# Patient Record
Sex: FEMALE | Race: ASIAN | NOT HISPANIC OR LATINO
[De-identification: names, ages, dates, MRNs, and addresses within clinical notes are randomized per-mention and may not be internally consistent; named-entity substitution may affect disease eponyms.]

---

## 2023-09-08 PROBLEM — Z00.00 ENCOUNTER FOR PREVENTIVE HEALTH EXAMINATION: Status: ACTIVE | Noted: 2023-09-08

## 2023-09-12 ENCOUNTER — APPOINTMENT (OUTPATIENT)
Dept: GYNECOLOGIC ONCOLOGY | Facility: CLINIC | Age: 38
End: 2023-09-12
Payer: MEDICAID

## 2023-09-12 VITALS
HEIGHT: 60.63 IN | OXYGEN SATURATION: 99 % | WEIGHT: 114 LBS | SYSTOLIC BLOOD PRESSURE: 100 MMHG | BODY MASS INDEX: 21.8 KG/M2 | TEMPERATURE: 97.8 F | HEART RATE: 89 BPM | RESPIRATION RATE: 17 BRPM | DIASTOLIC BLOOD PRESSURE: 67 MMHG

## 2023-09-12 DIAGNOSIS — Z83.3 FAMILY HISTORY OF DIABETES MELLITUS: ICD-10-CM

## 2023-09-12 DIAGNOSIS — F17.210 NICOTINE DEPENDENCE, CIGARETTES, UNCOMPLICATED: ICD-10-CM

## 2023-09-12 PROCEDURE — 99204 OFFICE O/P NEW MOD 45 MIN: CPT

## 2023-09-13 LAB
ALBUMIN SERPL ELPH-MCNC: 4.7 G/DL
ALP BLD-CCNC: 44 U/L
ALT SERPL-CCNC: 17 U/L
ANION GAP SERPL CALC-SCNC: 12 MMOL/L
AST SERPL-CCNC: 15 U/L
BILIRUB SERPL-MCNC: 0.4 MG/DL
BUN SERPL-MCNC: 11 MG/DL
CALCIUM SERPL-MCNC: 9.6 MG/DL
CHLORIDE SERPL-SCNC: 102 MMOL/L
CO2 SERPL-SCNC: 27 MMOL/L
CREAT SERPL-MCNC: 0.57 MG/DL
EGFR: 118 ML/MIN/1.73M2
GLUCOSE SERPL-MCNC: 102 MG/DL
HCG SERPL QL: NEGATIVE
HCT VFR BLD CALC: 36.8 %
HGB BLD-MCNC: 11.9 G/DL
MCHC RBC-ENTMCNC: 32 PG
MCHC RBC-ENTMCNC: 32.3 GM/DL
MCV RBC AUTO: 98.9 FL
PAPP-A SERPL-ACNC: <1 MIU/ML
PLATELET # BLD AUTO: 300 K/UL
POTASSIUM SERPL-SCNC: 4.6 MMOL/L
PROT SERPL-MCNC: 7.1 G/DL
RBC # BLD: 3.72 M/UL
RBC # FLD: 12.5 %
SODIUM SERPL-SCNC: 141 MMOL/L
WBC # FLD AUTO: 6.32 K/UL

## 2023-09-20 ENCOUNTER — OUTPATIENT (OUTPATIENT)
Dept: OUTPATIENT SERVICES | Facility: HOSPITAL | Age: 38
LOS: 1 days | End: 2023-09-20

## 2023-09-20 ENCOUNTER — OUTPATIENT (OUTPATIENT)
Dept: OUTPATIENT SERVICES | Facility: HOSPITAL | Age: 38
LOS: 1 days | End: 2023-09-20
Payer: COMMERCIAL

## 2023-09-20 VITALS
RESPIRATION RATE: 16 BRPM | OXYGEN SATURATION: 97 % | HEART RATE: 73 BPM | WEIGHT: 115.08 LBS | TEMPERATURE: 98 F | DIASTOLIC BLOOD PRESSURE: 67 MMHG | SYSTOLIC BLOOD PRESSURE: 98 MMHG | HEIGHT: 60 IN

## 2023-09-20 DIAGNOSIS — D06.9 CARCINOMA IN SITU OF CERVIX, UNSPECIFIED: ICD-10-CM

## 2023-09-20 DIAGNOSIS — Z98.891 HISTORY OF UTERINE SCAR FROM PREVIOUS SURGERY: Chronic | ICD-10-CM

## 2023-09-20 DIAGNOSIS — Z01.818 ENCOUNTER FOR OTHER PREPROCEDURAL EXAMINATION: ICD-10-CM

## 2023-09-20 LAB
ALBUMIN SERPL ELPH-MCNC: 4 G/DL — SIGNIFICANT CHANGE UP (ref 3.5–5)
ALP SERPL-CCNC: 38 U/L — LOW (ref 40–120)
ALT FLD-CCNC: 25 U/L DA — SIGNIFICANT CHANGE UP (ref 10–60)
ANION GAP SERPL CALC-SCNC: 9 MMOL/L — SIGNIFICANT CHANGE UP (ref 5–17)
APTT BLD: 28.3 SEC — SIGNIFICANT CHANGE UP (ref 24.5–35.6)
AST SERPL-CCNC: 7 U/L — LOW (ref 10–40)
BILIRUB SERPL-MCNC: 0.4 MG/DL — SIGNIFICANT CHANGE UP (ref 0.2–1.2)
BLD GP AB SCN SERPL QL: SIGNIFICANT CHANGE UP
BUN SERPL-MCNC: 13 MG/DL — SIGNIFICANT CHANGE UP (ref 7–18)
CALCIUM SERPL-MCNC: 8.6 MG/DL — SIGNIFICANT CHANGE UP (ref 8.4–10.5)
CHLORIDE SERPL-SCNC: 105 MMOL/L — SIGNIFICANT CHANGE UP (ref 96–108)
CO2 SERPL-SCNC: 25 MMOL/L — SIGNIFICANT CHANGE UP (ref 22–31)
CREAT SERPL-MCNC: 0.56 MG/DL — SIGNIFICANT CHANGE UP (ref 0.5–1.3)
EGFR: 119 ML/MIN/1.73M2 — SIGNIFICANT CHANGE UP
GLUCOSE SERPL-MCNC: 107 MG/DL — HIGH (ref 70–99)
HCT VFR BLD CALC: 35.1 % — SIGNIFICANT CHANGE UP (ref 34.5–45)
HGB BLD-MCNC: 11.7 G/DL — SIGNIFICANT CHANGE UP (ref 11.5–15.5)
INR BLD: 0.91 RATIO — SIGNIFICANT CHANGE UP (ref 0.85–1.18)
MCHC RBC-ENTMCNC: 31.5 PG — SIGNIFICANT CHANGE UP (ref 27–34)
MCHC RBC-ENTMCNC: 33.3 GM/DL — SIGNIFICANT CHANGE UP (ref 32–36)
MCV RBC AUTO: 94.4 FL — SIGNIFICANT CHANGE UP (ref 80–100)
NRBC # BLD: 0 /100 WBCS — SIGNIFICANT CHANGE UP (ref 0–0)
PLATELET # BLD AUTO: 243 K/UL — SIGNIFICANT CHANGE UP (ref 150–400)
POTASSIUM SERPL-MCNC: 3.9 MMOL/L — SIGNIFICANT CHANGE UP (ref 3.5–5.3)
POTASSIUM SERPL-SCNC: 3.9 MMOL/L — SIGNIFICANT CHANGE UP (ref 3.5–5.3)
PROT SERPL-MCNC: 7.5 G/DL — SIGNIFICANT CHANGE UP (ref 6–8.3)
PROTHROM AB SERPL-ACNC: 10.4 SEC — SIGNIFICANT CHANGE UP (ref 9.5–13)
RBC # BLD: 3.72 M/UL — LOW (ref 3.8–5.2)
RBC # FLD: 12.1 % — SIGNIFICANT CHANGE UP (ref 10.3–14.5)
SODIUM SERPL-SCNC: 139 MMOL/L — SIGNIFICANT CHANGE UP (ref 135–145)
WBC # BLD: 6.31 K/UL — SIGNIFICANT CHANGE UP (ref 3.8–10.5)
WBC # FLD AUTO: 6.31 K/UL — SIGNIFICANT CHANGE UP (ref 3.8–10.5)

## 2023-09-20 RX ORDER — SODIUM CHLORIDE 9 MG/ML
3 INJECTION INTRAMUSCULAR; INTRAVENOUS; SUBCUTANEOUS EVERY 8 HOURS
Refills: 0 | Status: DISCONTINUED | OUTPATIENT
Start: 2023-10-02 | End: 2023-10-16

## 2023-09-20 NOTE — H&P PST ADULT - NSANTHOSAYNRD_GEN_A_CORE
No. JAHAIRA screening performed.  STOP BANG Legend: 0-2 = LOW Risk; 3-4 = INTERMEDIATE Risk; 5-8 = HIGH Risk

## 2023-09-20 NOTE — H&P PST ADULT - HISTORY OF PRESENT ILLNESS
38year old female with no reported pmhx presents with c/o abnormal cervical cells found on pap smear. Patient is here today for presurgical testing for scheduled cone biopsy on 10/2/2023

## 2023-09-20 NOTE — H&P PST ADULT - PROBLEM SELECTOR PLAN 1
Patient is scheduled for cone biopsy on 10/2/2023  Written and oral preoperative instructions given to patient with understanding verbalized.   Instructions given to include using 4% chlorhexidine wash as directed starting 3days before day of surgery (inclusive of day of surgery)  Maintaining NPO status post midnight day before surgery  Stopping aspirin, NSAIDs, herbs, vitamins 7days before surgery   Patient is to expect a phone call day before surgery between the hours of 430- 630pm giving arrival time for surgery day.    Patient today with STOP bang Score of 0, Low risk for JAHIARA   Preoperative labs drawn here today, results pending

## 2023-09-20 NOTE — H&P PST ADULT - ATTENDING COMMENTS
Met patient and her friend at the hospital    She just finished her menstruation  We went over the pathology consult result, same as outside diagnosis  We went over again the nature of the procedure  we discussed risks and benefits  we discussed complications  we discussed post operative care  answered all questions  consent form signed  friend will wait for her    procedure cone biopsy  benefits removal of diseased tissue  complications discussed  alternative none equivalent

## 2023-09-21 LAB — SURGICAL PATHOLOGY STUDY: SIGNIFICANT CHANGE UP

## 2023-09-21 PROCEDURE — 86850 RBC ANTIBODY SCREEN: CPT

## 2023-09-21 PROCEDURE — 86900 BLOOD TYPING SEROLOGIC ABO: CPT

## 2023-09-21 PROCEDURE — 80053 COMPREHEN METABOLIC PANEL: CPT

## 2023-09-21 PROCEDURE — 86901 BLOOD TYPING SEROLOGIC RH(D): CPT

## 2023-09-21 PROCEDURE — 85730 THROMBOPLASTIN TIME PARTIAL: CPT

## 2023-09-21 PROCEDURE — 85610 PROTHROMBIN TIME: CPT

## 2023-09-21 PROCEDURE — 85027 COMPLETE CBC AUTOMATED: CPT

## 2023-09-21 PROCEDURE — 36415 COLL VENOUS BLD VENIPUNCTURE: CPT

## 2023-09-21 PROCEDURE — G0463: CPT

## 2023-10-01 ENCOUNTER — TRANSCRIPTION ENCOUNTER (OUTPATIENT)
Age: 38
End: 2023-10-01

## 2023-10-02 ENCOUNTER — TRANSCRIPTION ENCOUNTER (OUTPATIENT)
Age: 38
End: 2023-10-02

## 2023-10-02 ENCOUNTER — APPOINTMENT (OUTPATIENT)
Dept: GYNECOLOGIC ONCOLOGY | Facility: CLINIC | Age: 38
End: 2023-10-02
Payer: MEDICAID

## 2023-10-02 ENCOUNTER — OUTPATIENT (OUTPATIENT)
Dept: OUTPATIENT SERVICES | Facility: HOSPITAL | Age: 38
LOS: 1 days | End: 2023-10-02
Payer: COMMERCIAL

## 2023-10-02 VITALS
RESPIRATION RATE: 18 BRPM | TEMPERATURE: 98 F | DIASTOLIC BLOOD PRESSURE: 56 MMHG | SYSTOLIC BLOOD PRESSURE: 102 MMHG | OXYGEN SATURATION: 100 % | HEART RATE: 70 BPM

## 2023-10-02 VITALS
WEIGHT: 115.08 LBS | HEART RATE: 67 BPM | SYSTOLIC BLOOD PRESSURE: 102 MMHG | RESPIRATION RATE: 16 BRPM | TEMPERATURE: 97 F | DIASTOLIC BLOOD PRESSURE: 67 MMHG | OXYGEN SATURATION: 98 % | HEIGHT: 60 IN

## 2023-10-02 DIAGNOSIS — D06.9 CARCINOMA IN SITU OF CERVIX, UNSPECIFIED: ICD-10-CM

## 2023-10-02 DIAGNOSIS — Z98.891 HISTORY OF UTERINE SCAR FROM PREVIOUS SURGERY: Chronic | ICD-10-CM

## 2023-10-02 LAB
BLD GP AB SCN SERPL QL: SIGNIFICANT CHANGE UP
HCG UR QL: NEGATIVE — SIGNIFICANT CHANGE UP

## 2023-10-02 PROCEDURE — 88307 TISSUE EXAM BY PATHOLOGIST: CPT

## 2023-10-02 PROCEDURE — 86900 BLOOD TYPING SEROLOGIC ABO: CPT

## 2023-10-02 PROCEDURE — 36415 COLL VENOUS BLD VENIPUNCTURE: CPT

## 2023-10-02 PROCEDURE — ZZZZZ: CPT

## 2023-10-02 PROCEDURE — 88307 TISSUE EXAM BY PATHOLOGIST: CPT | Mod: 26

## 2023-10-02 PROCEDURE — 81025 URINE PREGNANCY TEST: CPT

## 2023-10-02 PROCEDURE — 57520 CONIZATION OF CERVIX: CPT

## 2023-10-02 PROCEDURE — 86901 BLOOD TYPING SEROLOGIC RH(D): CPT

## 2023-10-02 PROCEDURE — 86850 RBC ANTIBODY SCREEN: CPT

## 2023-10-02 RX ORDER — SODIUM CHLORIDE 9 MG/ML
1000 INJECTION, SOLUTION INTRAVENOUS
Refills: 0 | Status: DISCONTINUED | OUTPATIENT
Start: 2023-10-02 | End: 2023-10-16

## 2023-10-02 RX ORDER — IBUPROFEN 200 MG
3 TABLET ORAL
Qty: 0 | Refills: 0 | DISCHARGE

## 2023-10-02 RX ORDER — ACETAMINOPHEN 500 MG
3 TABLET ORAL
Qty: 0 | Refills: 0 | DISCHARGE

## 2023-10-02 NOTE — ASU DISCHARGE PLAN (ADULT/PEDIATRIC) - ASU DC SPECIAL INSTRUCTIONSFT
PIV removed per MD order.  Intact and pressure applied.  No redness or edema not to site. Written and verbal discharge instructions given.  Patient verbalized understanding of all instructions.  [employer] called to  patient.  Patient awaiting taxi home.    Postoperative Instructions      Pain control     For pain control, take the followin. Motrin 600mg four times a day, take with food  2. Add Tylenol 975 four times a day, alternated with motrin    Motrin and Tylenol can be obtained over the counter.     Postoperative restrictions   Do not drive or make important decisions for 24 hours after anesthesia. You may feel drowsy for 24 hours and should have a responsible adult with you during that time. Nothing in the vagina (tampons, sexual intercourse), No tub baths, pools or hot tubs for 2 weeks (showers are ok!). No lifting anything heavier than 15 lbs, no strenuous exercise for 2 weeks after surgery. Do not pull or cut any stitches that you see around your incision.         Vaginal discharge   Vaginal discharge (all colors) is normal after vaginal surgery. You have sutures in your vagina which take 3 months to fully absorb. You may have vaginal discharge during this time. This is normal.  A solution called Monsel was also placed at your surgical site at the conclusion of your surgery to help with bleeding. It can cause brown or coffee-ground like discharge the first day after surgery, this is normal.      Signs of Infection  Some postoperative pain and discomfort is normal. However, if you experience any of the following, you may be developing an infection and should be seen by your doctor: pain that does not get better with the oral medications listed above, fever greater than 100.4F, foul smelling discharge coming from the surgical site, nausea and vomiting that does not stop (especially if you are unable to tolerate oral intake), or inability to urinate. If you experience any of these symptoms, call your doctor's office to be seen right away.    Follow Up  Attend your postoperative appointment with Dr. NULL ().  Call Dr. NULL's office to schedule a postoperative appointment in 2 weeks. The results from the procedure will be discussed with you at that time.

## 2023-10-02 NOTE — BRIEF OPERATIVE NOTE - OPERATION/FINDINGS
EUA small mobile anteverted uterus, no lesions grossly appreciated, lugol over cervix revealed no stain deficits.  Cone biopsy obtained, excision 1.5cm across at base and 2cm in depth, minimal bleeding at conclusion of surgery s/p cautery and Monsels application. KRUPA wnl EUA small mobile anteverted uterus, smooth appearing cervix with no abnormalities appreciated under gross inspection. Lugol over cervix revealed no stain deficits, no lesions identified.  Cone biopsy obtained, excision 1.5cm across at base and 2cm in depth, minimal bleeding at conclusion of surgery s/p cautery and Monsels application. KRUPA wnl

## 2023-10-02 NOTE — ASU DISCHARGE PLAN (ADULT/PEDIATRIC) - CARE PROVIDER_API CALL
Ferdinand Carreno-Ankur  Gynecologic Oncology  [address], [place] [address]  Phone: ([phone]  Fax: ()-  Follow Up Time: 2 weeks

## 2023-10-04 LAB — SURGICAL PATHOLOGY STUDY: SIGNIFICANT CHANGE UP

## 2023-10-24 ENCOUNTER — APPOINTMENT (OUTPATIENT)
Dept: GYNECOLOGIC ONCOLOGY | Facility: CLINIC | Age: 38
End: 2023-10-24
Payer: MEDICAID

## 2023-10-24 VITALS
SYSTOLIC BLOOD PRESSURE: 101 MMHG | HEART RATE: 82 BPM | TEMPERATURE: 96.8 F | WEIGHT: 120 LBS | HEIGHT: 60.63 IN | BODY MASS INDEX: 22.95 KG/M2 | RESPIRATION RATE: 17 BRPM | OXYGEN SATURATION: 100 % | DIASTOLIC BLOOD PRESSURE: 71 MMHG

## 2023-10-24 PROBLEM — Z78.9 OTHER SPECIFIED HEALTH STATUS: Chronic | Status: ACTIVE | Noted: 2023-09-20

## 2023-10-24 PROCEDURE — 99024 POSTOP FOLLOW-UP VISIT: CPT

## 2024-02-27 ENCOUNTER — APPOINTMENT (OUTPATIENT)
Dept: GYNECOLOGIC ONCOLOGY | Facility: CLINIC | Age: 39
End: 2024-02-27
Payer: MEDICAID

## 2024-02-27 ENCOUNTER — LABORATORY RESULT (OUTPATIENT)
Age: 39
End: 2024-02-27

## 2024-02-27 VITALS
RESPIRATION RATE: 18 BRPM | WEIGHT: 116 LBS | SYSTOLIC BLOOD PRESSURE: 106 MMHG | HEART RATE: 87 BPM | BODY MASS INDEX: 22.19 KG/M2 | OXYGEN SATURATION: 97 % | DIASTOLIC BLOOD PRESSURE: 70 MMHG | TEMPERATURE: 97.9 F

## 2024-02-27 PROCEDURE — 99213 OFFICE O/P EST LOW 20 MIN: CPT | Mod: 25

## 2024-02-27 PROCEDURE — 57505 ENDOCERVICAL CURETTAGE: CPT

## 2024-02-27 NOTE — LETTER BODY
[Attached please find my note.] : Attached please find my note. [FreeTextEntry2] : Sylvia Thomas -[address] [place] [address] [address], [place] [address] Tel [phone] Fax [phone]  Dear William   Ms Ramez Canada was seen in my office for a f/u pap smear and ECC  Please see my consult note for her plan of care.  Thank you for allowing me to participate in the care of this patient.    Please do not hesitate to call if you have any questions.    Most Sincerely,      Ferdinand Carreno MD [facility] Director, [facility] Division of Gynecologic Oncology Department Obstetrics and Gynecology Tel [phone] [email or web address]

## 2024-02-27 NOTE — REASON FOR VISIT
[FreeTextEntry1] : Patient is here for a Pap smear and the endocervical curettage after her cone biopsy in October 2023

## 2024-02-27 NOTE — PHYSICAL EXAM
[Chaperone Present] : A chaperone was present in the examining room during all aspects of the physical examination [Present] : CVAT: present [Normal] : External genitalia: Normal, no lesions appreciated [de-identified] : a narrow cervcial canal shortened cervix.  ECC done with difficulty pap done [Fully active, able to carry on all pre-disease performance without restriction] : Status 0 - Fully active, able to carry on all pre-disease performance without restriction

## 2024-02-27 NOTE — PROCEDURE
[Cervical Biopsy] : a cervical biopsy [Yes] : the specimen was sent to pathology [FreeTextEntry1] : endocervical curettage was done with difficulty due to the narrow fibrotic cervical canal from her recent cervical conization.  We had to use cervical dilator to try and expand the canal.  Patient was still very uncomfortable well with the ECC was done which we obtained minimal tissue

## 2024-02-27 NOTE — ASSESSMENT
[FreeTextEntry1] : DEMARCO-3 with positive endocervical margin Pap smear and endocervical curettage were performed today.  We will let patient know the results and plan of management subsequently. If these results were normal patient can return to Dr. Thomas for a subsequent routine Pap smear a year from now.  If there is insufficient tissue will bring patient back within the next 4 to 6 months for a follow-up assessment.

## 2024-02-27 NOTE — HISTORY OF PRESENT ILLNESS
[FreeTextEntry1] : Patient is last menstrual period was on February 17, 2024 She was status post a cervical conization October 2, 2023.  Her path report was a DEMARCO-3 disease with a focal positive endocervical margin.  Patient has no other complaints. Her menstruation has been regular.

## 2024-02-29 LAB — HPV HIGH+LOW RISK DNA PNL CVX: DETECTED

## 2024-03-05 LAB
CORE LAB BIOPSY: NORMAL
CYTOLOGY CVX/VAG DOC THIN PREP: ABNORMAL

## 2024-03-12 ENCOUNTER — APPOINTMENT (OUTPATIENT)
Dept: GYNECOLOGIC ONCOLOGY | Facility: CLINIC | Age: 39
End: 2024-03-12
Payer: MEDICAID

## 2024-03-12 VITALS
BODY MASS INDEX: 21.8 KG/M2 | SYSTOLIC BLOOD PRESSURE: 99 MMHG | OXYGEN SATURATION: 98 % | WEIGHT: 114 LBS | DIASTOLIC BLOOD PRESSURE: 65 MMHG | RESPIRATION RATE: 18 BRPM | TEMPERATURE: 97.5 F | HEART RATE: 81 BPM

## 2024-03-12 DIAGNOSIS — R87.810 CERVICAL HIGH RISK HUMAN PAPILLOMAVIRUS (HPV) DNA TEST POSITIVE: ICD-10-CM

## 2024-03-12 DIAGNOSIS — D06.9 CARCINOMA IN SITU OF CERVIX, UNSPECIFIED: ICD-10-CM

## 2024-03-12 PROCEDURE — 57452 EXAM OF CERVIX W/SCOPE: CPT

## 2024-03-12 NOTE — HISTORY OF PRESENT ILLNESS
[FreeTextEntry1] : The patient appear well.  She has no complaints. She has normal GI/ function.  Her most recent biopsy showed  2/27/24  Specimen(s) Submitted 1  Endocervical curettage  Final Diagnosis 1.  Endocervix (curettage):      -   Very tiny fragments of benign endocervical cells.      -   Fragments of benign superficial squamous epithelium.  Furthermore the HPV 16 was positive on Pap smear  Patient was brought back here for a colposcopic evaluation. Explained the reasoning for doing the procedure. The patient signed consent form for colposcopy and possible biopsy

## 2024-03-12 NOTE — LETTER BODY
[Attached please find my note.] : Attached please find my note. [FreeTextEntry2] : Sylvia Thomas -[address] [facility] [address], [place] [address] Tel [phone] Fax [phone]  Dear Dr Thomas   Ms Gabriela Canada return to my office for a follow-up after having a positive HPV high risk COVID test.  Please see my consult note for her plan of care.  Thank you for allowing me to participate in the care of this patient.    Please do not hesitate to call if you have any questions.    Most Sincerely,      Ferdinand Carreno MD [facility] Director, [facility] Division of Gynecologic Oncology Department Obstetrics and Gynecology Tel [phone] [email or web address]

## 2024-03-12 NOTE — PROCEDURE
[Colposcopy] : colposcopy [HPV high risk] : PCR positive for high risk HPV [Patient] : the patient [Normal Staining] : normal staining [0] : 0 [SCJ Fully Visualized] : the squamocolumnar junction was not fully visualized [No Abnormalities] : no abnormalities seen [Biopsies Taken: # ___] : no biopsies were taken of the cervix [ECC Done] : an endocervical curettage was not performed [FreeTextEntry9] : recent cervical conization 10/2024 with positive margin [de-identified] : evaluation done on the cervix and the vagina [FreeTextEntry1] : Acetic acid applied no abnormality noted on the ectocervix. Endocervical curettage was already performed last time which was negative.

## 2024-03-12 NOTE — ASSESSMENT
[FreeTextEntry1] : The patient with DEMARCO-3 on recent cervical conization and a focal positive endocervical margin  Repeat Pap test showed presence of HPV high-risk type. Colposcopic evaluation today did not reveal any abnormal lesion. Advised patient to return to Dr. Thomas for a subsequent routine follow-up Pap smear in 6 months to 1 year. The patient was happy with the results.  She can certainly return if there is any subsequent significant abnormality.

## (undated) DEVICE — SUCTION YANKAUER OPEN TIP NO VENT CURVE

## (undated) DEVICE — Device

## (undated) DEVICE — WARMING BLANKET UPPER ADULT

## (undated) DEVICE — DRAPE LIGHT HANDLE COVER (BLUE)

## (undated) DEVICE — ELCTR GROUNDING PAD ADULT COVIDIEN

## (undated) DEVICE — VENODYNE/SCD SLEEVE CALF MEDIUM

## (undated) DEVICE — ELCTR BALL LLETZ LG 5MM

## (undated) DEVICE — SOL IRR POUR H2O 500ML

## (undated) DEVICE — GOWN XL

## (undated) DEVICE — PACK PERI GYN

## (undated) DEVICE — TUBING SUCTION NONCONDUCTIVE 6MM X 12FT

## (undated) DEVICE — APPLICATOR Q TIP 6" WOOD STEM

## (undated) DEVICE — GLV 7.5 PROTEXIS (WHITE)

## (undated) DEVICE — PACK MINOR NO DRAPE

## (undated) DEVICE — BASIN SET SINGLE

## (undated) DEVICE — SUT POLYSORB 0 36" GS-21

## (undated) DEVICE — FOR-ESU VALLEYLAB T7E14996DX: Type: DURABLE MEDICAL EQUIPMENT

## (undated) DEVICE — LAP PAD W RING 18 X 18"

## (undated) DEVICE — SUT SOFSILK 0 30" V-20

## (undated) DEVICE — FOLEY CATH 2-WAY 14FR 5CC SILICONE

## (undated) DEVICE — SOL IRR POUR NS 0.9% 1500ML